# Patient Record
Sex: MALE | Race: WHITE | NOT HISPANIC OR LATINO
[De-identification: names, ages, dates, MRNs, and addresses within clinical notes are randomized per-mention and may not be internally consistent; named-entity substitution may affect disease eponyms.]

---

## 2022-04-11 ENCOUNTER — TRANSCRIPTION ENCOUNTER (OUTPATIENT)
Age: 62
End: 2022-04-11

## 2022-07-16 ENCOUNTER — APPOINTMENT (OUTPATIENT)
Dept: ORTHOPEDIC SURGERY | Facility: CLINIC | Age: 62
End: 2022-07-16

## 2022-07-16 VITALS — BODY MASS INDEX: 32.14 KG/M2 | HEIGHT: 66 IN | WEIGHT: 200 LBS

## 2022-07-16 DIAGNOSIS — Z78.9 OTHER SPECIFIED HEALTH STATUS: ICD-10-CM

## 2022-07-16 PROBLEM — Z00.00 ENCOUNTER FOR PREVENTIVE HEALTH EXAMINATION: Status: ACTIVE | Noted: 2022-07-16

## 2022-07-16 PROCEDURE — 99203 OFFICE O/P NEW LOW 30 MIN: CPT

## 2022-07-16 PROCEDURE — 72100 X-RAY EXAM L-S SPINE 2/3 VWS: CPT

## 2022-07-16 PROCEDURE — 72170 X-RAY EXAM OF PELVIS: CPT

## 2022-07-16 RX ORDER — METHYLPREDNISOLONE 4 MG/1
4 TABLET ORAL
Qty: 1 | Refills: 0 | Status: ACTIVE | COMMUNITY
Start: 2022-07-16 | End: 1900-01-01

## 2022-07-16 RX ORDER — METHOCARBAMOL 750 MG/1
750 TABLET, FILM COATED ORAL
Qty: 30 | Refills: 0 | Status: ACTIVE | COMMUNITY
Start: 2022-07-16 | End: 1900-01-01

## 2022-07-16 NOTE — HISTORY OF PRESENT ILLNESS
[Lower back] : lower back [10] : 10 [2] : 2 [Dull/Aching] : dull/aching [Intermittent] : intermittent [Full time] : Work status: full time [de-identified] : This is a 62 year old male with complaints of low back pain for one week. He denies any injury or trauma. PAin is exacerbated with standing up straight, getting up from a chair or lying down. Pain is diffuse about the low back with some radiating into the buttocks on both sides. He denies any leg pain, denies N/T, denies b/b incontinence. He has tried naproxen which does help some. He reports similar instances like this "once every ten years". He has not had any physical therapy or pain management for this. He thinks he might have been given a muscle relaxer. The pain usually goes away on its own, but this time it is not going away. \par \par PMH: denies \par Allergies: NKDA  [] : Post Surgical Visit: no [FreeTextEntry5] : patient complains of lower back pain since 1 week ago\par no injury [de-identified] : movements

## 2022-07-16 NOTE — IMAGING
[de-identified] : The patient appears well develop, well nourished and in no acute distress. A&O x 3. \par \par Lumbar spine range of motion forward flexion to 75 degrees, extension 20 degrees, right lateral bending 10 degree, left lateral bending 20 degrees. There is pain with left lateral bending. No bony or point tenderness. There is palpable muscle spasm. Normal heel and toe walking. Sensation grossly intact to light touch. 5/5 strength with hip flexion/knee flexion/knee extension/plantarflexion/dorsiflexion/EHL. Normal SLR. \par \par \par

## 2022-07-16 NOTE — DISCUSSION/SUMMARY
[de-identified] : Mr. LEXI MÉNDEZ is a 62 year year old male  diagnosed with an acute lumbar sprain with DDD. Diagnosis was discussed and treatment options were reviewed. He were prescribed a course of physical therapy. The were advised to avoid any heavy lifting or other exacerbating factors. A prescription for a MDP and muscle relaxers were prescribed for the patient. The muscle relaxers can be sedating, so they were advised driving while on these medications. If he develops any symptoms of bowel or bladder incontinence, saddle anesthesia, new onset weakness in the legs or trouble walking, he will go to the ED immediately. If his symptoms persist with conservative care, he should follow up with a spine specialist. All of his questions were answered. HE understand and agree. \par \par

## 2022-07-16 NOTE — ASSESSMENT
[FreeTextEntry1] : 2 views of the lumbar spine ordered and interpreted by myself demonstrate no evidence of any acute fractures or dislocations. There is straightening of the normal lumbar lordosis. There is Disc spaces narrowing noted most severe L2/3. Vertebral heights are well maintained. L5 retrolisthesis.

## 2022-07-27 ENCOUNTER — APPOINTMENT (OUTPATIENT)
Dept: ORTHOPEDIC SURGERY | Facility: CLINIC | Age: 62
End: 2022-07-27

## 2022-07-27 DIAGNOSIS — S39.012A STRAIN OF MUSCLE, FASCIA AND TENDON OF LOWER BACK, INITIAL ENCOUNTER: ICD-10-CM

## 2022-07-27 DIAGNOSIS — M47.816 SPONDYLOSIS W/OUT MYELOPATHY OR RADICULOPATHY, LUMBAR REGION: ICD-10-CM

## 2022-07-27 DIAGNOSIS — M51.36 OTHER INTERVERTEBRAL DISC DEGENERATION, LUMBAR REGION: ICD-10-CM

## 2022-07-27 DIAGNOSIS — M43.16 SPONDYLOLISTHESIS, LUMBAR REGION: ICD-10-CM

## 2022-07-27 PROCEDURE — 99214 OFFICE O/P EST MOD 30 MIN: CPT

## 2022-07-27 NOTE — HISTORY OF PRESENT ILLNESS
[Lower back] : lower back [Gradual] : gradual [Sudden] : sudden [6] : 6 [4] : 4 [Burning] : burning [Shooting] : shooting [Stabbing] : stabbing [Exercising] : exercising [Full time] : Work status: full time [de-identified] : Note from urgent care:\par "7/16/22: This is a 62 year old male with complaints of low back pain for one week. He denies any injury or trauma. PAin is exacerbated with standing up straight, getting up from a chair or lying down. Pain is diffuse about the low back with some radiating into the buttocks on both sides. He denies any leg pain, denies N/T, denies b/b incontinence. He has tried naproxen which does help some. He reports similar instances like this "once every ten years". He has not had any physical therapy or pain management for this. He thinks he might have been given a muscle relaxer. The pain usually goes away on its own, but this time it is not going away. \par \par PMH: denies \par Allergies: NKDA "\par \par -------------------------------------------------------------------------------------------------\par \par 07/27/2022: 62 year old male here as consult, saw [facility]  7/16/22 for new onset low back pain w/out radicular sx. Given MDP/ muscle relaxer/ PT. No inciting injury. Feeling better now. Attributes recovery to MDP. Did not go to PT. \par \par Does have long history of back pain over the years \par has had severe pain flare ups in the past \par \par PT was booked up so hasn’t been able to get in there yet \par \par H/o Low back pain for years \par \par Has not tried Chiro/ accupunture/ PT/ pain management recently\par Has does chiro in the past \par No prior surgery in back \par \par PMH: No DM/ cancers/ no blood thinners\par \par Xrays reviewed:\par L-spine - DS at l4-5 \par AP pelvis - negative  [] : Post Surgical Visit: no [FreeTextEntry1] : L spine [FreeTextEntry5] : pt was initally seen at urgent care for lower back pain that was radiating into both legs, pt was given MDP and muscle relaxers and pt said that helped but hasn't started pt yet  [de-identified] : chropractor visits [de-identified] :

## 2022-07-27 NOTE — DISCUSSION/SUMMARY
[de-identified] : reviewed the case with him - doing better now - REC pt \par DISCUSSION OF TX OPTOINS FOR ds AT l4-5 \par FU AS NEEDED

## 2023-10-03 ENCOUNTER — NON-APPOINTMENT (OUTPATIENT)
Age: 63
End: 2023-10-03

## 2024-06-14 ENCOUNTER — TRANSCRIPTION ENCOUNTER (OUTPATIENT)
Age: 64
End: 2024-06-14

## 2024-06-14 ENCOUNTER — APPOINTMENT (OUTPATIENT)
Dept: ORTHOPEDIC SURGERY | Facility: CLINIC | Age: 64
End: 2024-06-14
Payer: COMMERCIAL

## 2024-06-14 VITALS — HEIGHT: 67 IN | WEIGHT: 200 LBS | BODY MASS INDEX: 31.39 KG/M2

## 2024-06-14 DIAGNOSIS — Z78.9 OTHER SPECIFIED HEALTH STATUS: ICD-10-CM

## 2024-06-14 DIAGNOSIS — S63.630A SPRAIN OF INTERPHALANGEAL JOINT OF RIGHT INDEX FINGER, INITIAL ENCOUNTER: ICD-10-CM

## 2024-06-14 PROCEDURE — 73140 X-RAY EXAM OF FINGER(S): CPT | Mod: RT

## 2024-06-14 PROCEDURE — 99203 OFFICE O/P NEW LOW 30 MIN: CPT

## 2024-06-14 NOTE — HISTORY OF PRESENT ILLNESS
[de-identified] : R IF twisting injury 3-4 weeks ago He has pain and swelling   [6] : 6 [Dull/Aching] : dull/aching [] : no [FreeTextEntry1] : R IF  [FreeTextEntry3] : 3 weeks ago [FreeTextEntry5] : NI R IF got caught into something

## 2024-06-14 NOTE — ASSESSMENT
[FreeTextEntry1] : The patient was advised of the diagnosis. The natural history of the pathology was explained in full to the patient in layman's terms. All questions were answered. The risks and benefits of surgical and non-surgical treatment alternatives were explained in full to the patient.  NSAIDs at night before bed Warm soaks in morning. Return prn